# Patient Record
Sex: MALE | Race: WHITE | Employment: OTHER
[De-identification: names, ages, dates, MRNs, and addresses within clinical notes are randomized per-mention and may not be internally consistent; named-entity substitution may affect disease eponyms.]

---

## 2023-09-29 ENCOUNTER — HOSPITAL ENCOUNTER (OUTPATIENT)
Facility: HOSPITAL | Age: 68
Setting detail: RECURRING SERIES
End: 2023-09-29
Payer: MEDICARE

## 2023-09-29 PROCEDURE — 97110 THERAPEUTIC EXERCISES: CPT

## 2023-09-29 PROCEDURE — 97162 PT EVAL MOD COMPLEX 30 MIN: CPT

## 2023-09-29 NOTE — PROGRESS NOTES
PT DAILY TREATMENT NOTE/LUMBAR EVAL     Patient Name: Fatoumata Small    Date: 2023    : 1955  Insurance: Payor: MEDICARE / Plan: MEDICARE PART A AND B / Product Type: *No Product type* /      Patient  verified yes     Visit #   Current / Total 1 12   Time   In / Out 9:03 9:44   Pain   In / Out 1.5 1.5   Subjective Functional Status/Changes: See eval       Treatment Area: Other low back pain [M54.59]  SUBJECTIVE  Pain Level (0-10 scale): Current pain: 1.5, worst: 8/10,   []constant []intermittent []improving []worsening []no change since onset  Any medication changes, allergies to medications, adverse drug reactions, diagnosis change, or new procedure performed?: [x] No    [] Yes (see summary sheet for update)  Subjective functional status/changes:     Off and on for months pain in L buttocks that feels like a pardeep horse that is worse depending on if he exercised. Particularly painful after pickle ball thinking because of squatting. Worst pain at night and in the morning to the point where he doesn't want to get out of bed, but reports it gets better throughout day. Difficult with rotation to get in/out of car and crossing his legs while sitting down to put on shoes is painful. Steroids have helped, reports minimal pain and just minor pardeep horse sensation. Reports pain when his \"muscles get cold\" and hasn't been moving. Denies burning/tingling/numbness. Pain walking on the beach/lots of walking would feel in the morning. Stairs hurt initially, really hard first thing in the morning, hard to sit<>stand. Pain pivoting. Alleviating: steroids, moving around. No PT for hip pain in the past.   PLOF: Pickleball, walks 5 miles  Mechanism of Injury: Insidious onset. Current symptoms/Complaints: Pain that feels like a pardeep horse through posterior hip musculature.   Previous Treatment/Compliance: steroids  Diagnostic Tests: [] Lab work [] X-rays    [] CT [] MRI     []

## 2023-09-29 NOTE — THERAPY EVALUATION
[address] [facility]  [address] [place] [address], [address], [place], [address] [place] [address] Ph: [phone] Fx: [phone]  Plan of Care / Statement of Necessity for Physical Therapy Services     Patient Name: Carolyn Martínez : 1955   Medical   Diagnosis: Other low back pain [M54.59] Treatment Diagnosis:   M54.59  OTHER LOWER BACK PAIN    Onset Date: 2023     Referral Source: Olivia Linda MD Start of Care [facility]): 2023   Prior Hospitalization: See medical history Provider #: [id number]   Prior Level of Function: Pickleball, walks 5 miles   Comorbidities: OA, CAD, osteoporosis     Assessment / key information:  Carolyn Martínez is a 76 y.o. male with Dx: lumbar radiculitis vs R hip pain. Pt rates pain as 8/10 max, 0/10 at best, 1.5/10 today, located along posterior hip musculature with intermittent radiation into middle of posterior thigh, stating it feels \"like a charley horse\". Aggravating factors include playing pickle ball, walking on the beach or uneven surfaces, and reports pain is worst first thing in the morning. Alleviating factors include steroids and movement. Pt would benefit from skilled PT to address ROM, strength, and pain to improve ability to perform hobbies of pickleball and improve overall QOL. Pertinent exam findings include:      Posture: Increased lumbar lordosis, hinging ~L4 during extension      Active Movements: [] N/A   [] Too acute   [] Other:  ROM % AROM Comments:pain, area   Forward flexion 40-60 75 Reports pain in posterior hip but states minimal   Extension 20-30 75 Denies pain   SB right 20-30 75 Denies pain   SB left 20-30 75 Denies pain   Rotation right 5-10 75 Denies pain   Rotation left 5-10 75 Denies pain      ROM (Degrees) Left Right   Hip Flexion 126 125   Hip IR 30 30   Hip ER 40 35      Repeated Movements   Reports pain with forward flexion repeatedly, but reports it doesn't radiate down into the leg.  Denies improvement with

## 2023-10-03 ENCOUNTER — HOSPITAL ENCOUNTER (OUTPATIENT)
Facility: HOSPITAL | Age: 68
Setting detail: RECURRING SERIES
Discharge: HOME | End: 2023-10-06
Payer: MEDICARE

## 2023-10-03 PROCEDURE — 97530 THERAPEUTIC ACTIVITIES: CPT

## 2023-10-03 PROCEDURE — 97112 NEUROMUSCULAR REEDUCATION: CPT

## 2023-10-03 PROCEDURE — 97110 THERAPEUTIC EXERCISES: CPT

## 2023-10-03 NOTE — PROGRESS NOTES
PHYSICAL / OCCUPATIONAL THERAPY - DAILY TREATMENT NOTE (updated )    Patient Name: Alyssa Puente    Date: 10/3/2023    : 1955  Insurance: Payor: MEDICARE / Plan: MEDICARE PART A AND B / Product Type: *No Product type* /      Patient  verified Yes     Visit #   Current / Total 2 12   Time   In / Out 11:40 12:36   Pain   In / Out 3-4 0   Subjective Functional Status/Changes: Reports began having pain again because steroid pack was finished. TREATMENT AREA =  Other low back pain [M54.59]    OBJECTIVE    Ice (UNBILLED):  location/position: lumbar/R hip     Min Rationale   10 decrease inflammation and decrease pain to improve patient's ability to progress to PLOF and address remaining functional goals. Skin assessment post-treatment:   Intact     Therapeutic Procedures:    68726 Therapeutic Exercise (timed):  increase ROM, strength, coordination, balance, and proprioception to improve patient's ability to progress to PLOF and address remaining functional goals. Tx Min Billable or 1:1 Min   (if diff from Tx Min) Details:   16 8 See flow sheet as applicable     10057 Neuromuscular Re-Education (timed):  improve balance, coordination, kinesthetic sense, posture, core stability and proprioception to improve patient's ability to develop conscious control of individual muscles and awareness of position of extremities in order to progress to PLOF and address remaining functional goals. Tx Min Billable or 1:1 Min   (if diff from Tx Min) Details:   20 20 See flow sheet as applicable     13191 Therapeutic Activity (timed):  use of dynamic activities replicating functional movements to increase ROM, strength, coordination, balance, and proprioception in order to improve patient's ability to progress to PLOF and address remaining functional goals. Tx Min Billable or 1:1 Min   (if diff from Tx Min) Details:   10 10 See flow sheet as applicable and discussion of activities to do at home.  Discussion of

## 2023-10-06 ENCOUNTER — HOSPITAL ENCOUNTER (OUTPATIENT)
Facility: HOSPITAL | Age: 68
Setting detail: RECURRING SERIES
Discharge: HOME | End: 2023-10-09
Payer: MEDICARE

## 2023-10-06 PROCEDURE — 97530 THERAPEUTIC ACTIVITIES: CPT

## 2023-10-06 PROCEDURE — 97110 THERAPEUTIC EXERCISES: CPT

## 2023-10-06 PROCEDURE — 97112 NEUROMUSCULAR REEDUCATION: CPT

## 2023-10-06 NOTE — PROGRESS NOTES
PHYSICAL / OCCUPATIONAL THERAPY - DAILY TREATMENT NOTE (updated )    Patient Name: Gissell Carrasco    Date: 10/6/2023    : 1955  Insurance: Payor: MEDICARE / Plan: MEDICARE PART A AND B / Product Type: *No Product type* /      Patient  verified Yes     Visit #   Current / Total 3 12   Time   In / Out 10:17 11:17   Pain   In / Out 2 0   Subjective Functional Status/Changes: Reports did single leg leg press at the gym and that helped eliminate pain in hips after playing pickleball. TREATMENT AREA =  Other low back pain [M54.59]    OBJECTIVE    Ice (UNBILLED):  location/position: hip/lumbar     Min Rationale   10 decrease inflammation and decrease pain to improve patient's ability to progress to PLOF and address remaining functional goals. Skin assessment post-treatment:   Intact     Therapeutic Procedures:    30529 Therapeutic Exercise (timed):  increase ROM, strength, coordination, balance, and proprioception to improve patient's ability to progress to PLOF and address remaining functional goals. Tx Min Billable or 1:1 Min   (if diff from Tx Min) Details:   15 10 See flow sheet as applicable     29701 Neuromuscular Re-Education (timed):  improve balance, coordination, kinesthetic sense, posture, core stability and proprioception to improve patient's ability to develop conscious control of individual muscles and awareness of position of extremities in order to progress to PLOF and address remaining functional goals. Tx Min Billable or 1:1 Min   (if diff from Tx Min) Details:   20  See flow sheet as applicable     15429 Therapeutic Activity (timed):  use of dynamic activities replicating functional movements to increase ROM, strength, coordination, balance, and proprioception in order to improve patient's ability to progress to PLOF and address remaining functional goals.    Tx Min Billable or 1:1 Min   (if diff from Tx Min) Details:   15  See flow sheet as applicable       50 39 [facility] Totals

## 2023-10-10 ENCOUNTER — HOSPITAL ENCOUNTER (OUTPATIENT)
Facility: HOSPITAL | Age: 68
Setting detail: RECURRING SERIES
Discharge: HOME | End: 2023-10-13
Payer: MEDICARE

## 2023-10-10 PROCEDURE — 97110 THERAPEUTIC EXERCISES: CPT

## 2023-10-10 PROCEDURE — 97530 THERAPEUTIC ACTIVITIES: CPT

## 2023-10-10 PROCEDURE — 97112 NEUROMUSCULAR REEDUCATION: CPT

## 2023-10-10 NOTE — PROGRESS NOTES
PHYSICAL / OCCUPATIONAL THERAPY - DAILY TREATMENT NOTE (updated )    Patient Name: Kendell Chavez    Date: 10/10/2023    : 1955  Insurance: Payor: MEDICARE / Plan: MEDICARE PART A AND B / Product Type: *No Product type* /      Patient  verified Yes     Visit #   Current / Total 4 12   Time   In / Out 1140 1233   Pain   In / Out 1 0   Subjective Functional Status/Changes: Pt states he has pain in his back when putting his shoes on     TREATMENT AREA =  Other low back pain [M54.59]    OBJECTIVE    Modalities Rationale:     decrease inflammation and decrease pain to improve patient's ability to progress to PLOF and address remaining functional goals. min [] Estim Unattended, type/location:                                      []  w/ice    []  w/heat    min [] Estim Attended, type/location:                                     []  w/US     []  w/ice    []  w/heat    []  TENS insruct      min []  Mechanical Traction: type/lbs                   []  pro   []  sup   []  int   []  cont    []  before manual    []  after manual    min []  Ultrasound, settings/location:     10 min  unbill [x]  Ice     []  Heat    location/position: L/S, right hip/reclined    min []  Paraffin,  details:     min []  Vasopneumatic Device, press/temp:     min []  Natalie Leahy / Lacey Seth: If using vaso (only need to measure limb vaso being performed on)      pre-treatment girth :       post-treatment girth :       measured at (landmark location) :      min []  Other:    Skin assessment post-treatment:   Intact      Therapeutic Procedures: Tx Min Billable or 1:1 Min (if diff from Tx Min) Procedure, Rationale, Specifics   22  54960 Therapeutic Exercise (timed):  increase ROM, strength, coordination, balance, and proprioception to improve patient's ability to progress to PLOF and address remaining functional goals.  (see flow sheet as applicable)     Details if applicable:       8  77854 Therapeutic Activity (timed):  use of dynamic

## 2023-10-13 ENCOUNTER — HOSPITAL ENCOUNTER (OUTPATIENT)
Facility: HOSPITAL | Age: 68
Setting detail: RECURRING SERIES
Discharge: HOME | End: 2023-10-16
Payer: MEDICARE

## 2023-10-13 PROCEDURE — 97110 THERAPEUTIC EXERCISES: CPT

## 2023-10-13 PROCEDURE — 97112 NEUROMUSCULAR REEDUCATION: CPT

## 2023-10-13 PROCEDURE — 97530 THERAPEUTIC ACTIVITIES: CPT

## 2023-10-13 NOTE — PROGRESS NOTES
improvement in self-reported function. Pt will be independent with final HEP to promote self-management of condition.   Pt will demonstrate at least 4+/5 hip strength to improve ability to perform pickleball     Next PN/ RC due 10/29/2023     PLAN  - Continue Plan of Care  - Upgrade activities as tolerated    Eleni Lester, PT    10/13/2023    11:48 AM    Future Appointments   Date Time Provider [address]   10/17/2023 11:40 AM Carito Villela [facility]   10/20/2023 11:40 AM Eleni Lester, PT [facility]   10/24/2023 11:40 AM Eleni Lester, PT [facility]   10/27/2023 11:40 AM Eleni Lester, PT [facility]   10/31/2023 11:40 AM Eleni Lester, PT [facility]   11/3/2023 11:40 AM Eleni Lester, PT [facility]   11[facility]7/2023 11:40 AM [facility]   11/10/2023 11:40 AM Eleni Lester, PT [facility]

## 2023-10-17 ENCOUNTER — HOSPITAL ENCOUNTER (OUTPATIENT)
Facility: HOSPITAL | Age: 68
Setting detail: RECURRING SERIES
Discharge: HOME | End: 2023-10-20
Payer: MEDICARE

## 2023-10-17 PROCEDURE — 97530 THERAPEUTIC ACTIVITIES: CPT

## 2023-10-17 PROCEDURE — 97110 THERAPEUTIC EXERCISES: CPT

## 2023-10-17 PROCEDURE — 97112 NEUROMUSCULAR REEDUCATION: CPT

## 2023-10-17 NOTE — PROGRESS NOTES
PHYSICAL / OCCUPATIONAL THERAPY - DAILY TREATMENT NOTE (updated )    Patient Name: Alexandra Gayle    Date: 10/17/2023    : 1955  Insurance: Payor: MEDICARE / Plan: MEDICARE PART A AND B / Product Type: *No Product type* /      Patient  verified Yes     Visit #   Current / Total 6 12   Time   In / Out 1140 1235   Pain   In / Out 0/10 0/10   Subjective Functional Status/Changes: Pt reports soreness in his lower back and right hip today. States he played pickleball yesterday and started having soreness. TREATMENT AREA =  Other low back pain [M54.59]    OBJECTIVE    Ice (UNBILLED):  location/position: sitting, lumbar/R hip        Min Rationale   10 decrease inflammation and decrease pain to improve patient's ability to progress to PLOF and address remaining functional goals. Skin assessment post-treatment:   Intact     Therapeutic Procedures:    21535 Therapeutic Exercise (timed):  increase ROM, strength, coordination, balance, and proprioception to improve patient's ability to progress to PLOF and address remaining functional goals. Tx Min Billable or 1:1 Min   (if diff from Tx Min) Details:   22  See flow sheet as applicable     03766 Neuromuscular Re-Education (timed):  improve balance, coordination, kinesthetic sense, posture, core stability and proprioception to improve patient's ability to develop conscious control of individual muscles and awareness of position of extremities in order to progress to PLOF and address remaining functional goals. Tx Min Billable or 1:1 Min   (if diff from Tx Min) Details:   13  See flow sheet as applicable     39392 Therapeutic Activity (timed):  use of dynamic activities replicating functional movements to increase ROM, strength, coordination, balance, and proprioception in order to improve patient's ability to progress to PLOF and address remaining functional goals.    Tx Min Billable or 1:1 Min   (if diff from Tx Min) Details:   10  See flow sheet as

## 2023-10-20 ENCOUNTER — HOSPITAL ENCOUNTER (OUTPATIENT)
Facility: HOSPITAL | Age: 68
Setting detail: RECURRING SERIES
Discharge: HOME | End: 2023-10-23
Payer: MEDICARE

## 2023-10-20 PROCEDURE — 97112 NEUROMUSCULAR REEDUCATION: CPT

## 2023-10-20 PROCEDURE — 97110 THERAPEUTIC EXERCISES: CPT

## 2023-10-20 PROCEDURE — 97530 THERAPEUTIC ACTIVITIES: CPT

## 2023-10-20 NOTE — PROGRESS NOTES
PHYSICAL / OCCUPATIONAL THERAPY - DAILY TREATMENT NOTE (updated )    Patient Name: Maria Esther Coles    Date: 10/20/2023    : 1955  Insurance: Payor: MEDICARE / Plan: MEDICARE PART A AND B / Product Type: *No Product type* /      Patient  verified Yes     Visit #   Current / Total 7 12   Time   In / Out 11:43 12:34   Pain   In / Out 0 0   Subjective Functional Status/Changes: Hasn't had pain over the last few days. No pain this morning. No pain with pickleball. TREATMENT AREA =  Other low back pain [M54.59]    OBJECTIVE    Ice (UNBILLED):  location/position: low back/posterior hip     Min Rationale   10 decrease inflammation and decrease pain to improve patient's ability to progress to PLOF and address remaining functional goals. Skin assessment post-treatment:   Intact     Therapeutic Procedures:    68622 Therapeutic Exercise (timed):  increase ROM, strength, coordination, balance, and proprioception to improve patient's ability to progress to PLOF and address remaining functional goals. Tx Min Billable or 1:1 Min   (if diff from Tx Min) Details:   10  See flow sheet as applicable     45031 Neuromuscular Re-Education (timed):  improve balance, coordination, kinesthetic sense, posture, core stability and proprioception to improve patient's ability to develop conscious control of individual muscles and awareness of position of extremities in order to progress to PLOF and address remaining functional goals. Tx Min Billable or 1:1 Min   (if diff from Tx Min) Details:   15  See flow sheet as applicable     84825 Therapeutic Activity (timed):  use of dynamic activities replicating functional movements to increase ROM, strength, coordination, balance, and proprioception in order to improve patient's ability to progress to PLOF and address remaining functional goals.    Tx Min Billable or 1:1 Min   (if diff from Tx Min) Details:   16  See flow sheet as applicable       41  [facility] Totals Reminder: Aditi Oro

## 2023-10-24 ENCOUNTER — HOSPITAL ENCOUNTER (OUTPATIENT)
Facility: HOSPITAL | Age: 68
Setting detail: RECURRING SERIES
Discharge: HOME | End: 2023-10-27
Payer: MEDICARE

## 2023-10-24 PROCEDURE — 97110 THERAPEUTIC EXERCISES: CPT

## 2023-10-24 PROCEDURE — 97112 NEUROMUSCULAR REEDUCATION: CPT

## 2023-10-24 PROCEDURE — 97530 THERAPEUTIC ACTIVITIES: CPT

## 2023-10-24 NOTE — PROGRESS NOTES
self-reported function. Pt will be independent with final HEP to promote self-management of condition.   Pt will demonstrate at least 4+/5 hip strength to improve ability to perform pickleball     Next PN/ RC due 10/29/2023      PLAN  - Continue Plan of Care  - Upgrade activities as tolerated    Sole Kumari, PTA    10/24/2023    11:25 AM    Future Appointments   Date Time Provider [address]   10/24/2023 11:40 AM Kelsi Rochester [facility]   10/27/2023 11:40 AM Timur Gaytan, PT [facility]   10/31/2023 11:40 AM Timur Gaytan, PT [facility]   11/3/2023 11:40 AM Timur Gaytan, PT [facility]   11/7/2023 11:40 AM Timur Gaytan, PT [facility]   11/10/2023 11:40 AM Timur Gaytan, PT [facility]

## 2023-10-27 ENCOUNTER — HOSPITAL ENCOUNTER (OUTPATIENT)
Facility: HOSPITAL | Age: 68
Setting detail: RECURRING SERIES
Discharge: HOME | End: 2023-10-30
Payer: MEDICARE

## 2023-10-27 PROCEDURE — 97112 NEUROMUSCULAR REEDUCATION: CPT

## 2023-10-27 PROCEDURE — 97530 THERAPEUTIC ACTIVITIES: CPT

## 2023-10-27 PROCEDURE — 97110 THERAPEUTIC EXERCISES: CPT

## 2023-10-27 NOTE — PROGRESS NOTES
PHYSICAL / OCCUPATIONAL THERAPY - DAILY TREATMENT NOTE (updated )    Patient Name: Krystal Carter    Date: 10/27/2023    : 1955  Insurance: Payor: MEDICARE / Plan: MEDICARE PART A AND B / Product Type: *No Product type* /      Patient  verified Yes     Visit #   Current / Total 9 12   Time   In / Out 11:40 12:22   Pain   In / Out 0 0   Subjective Functional Status/Changes: Pt reports 90% improvement. \"Everything's gotten better. \" Pain has gone down 90%. Less pain playing pickleball. Slow first thing in the morning, but once up and moving okay. Pain with walking on beach in the sand, if very active throughout the day, reaching ~5-6/10. Reports compliance with HEP. TREATMENT AREA =  Other low back pain [M54.59]    OBJECTIVE    Therapeutic Procedures:    51620 Therapeutic Exercise (timed):  increase ROM, strength, coordination, balance, and proprioception to improve patient's ability to progress to PLOF and address remaining functional goals. Tx Min Billable or 1:1 Min   (if diff from Tx Min) Details:   10  See flow sheet as applicable     17951 Neuromuscular Re-Education (timed):  improve balance, coordination, kinesthetic sense, posture, core stability and proprioception to improve patient's ability to develop conscious control of individual muscles and awareness of position of extremities in order to progress to PLOF and address remaining functional goals. Tx Min Billable or 1:1 Min   (if diff from Tx Min) Details:   15  See flow sheet as applicable     47158 Therapeutic Activity (timed):  use of dynamic activities replicating functional movements to increase ROM, strength, coordination, balance, and proprioception in order to improve patient's ability to progress to PLOF and address remaining functional goals.    Tx Min Billable or 1:1 Min   (if diff from Tx Min) Details:   17  See flow sheet as applicable       43  [facility] Totals Reminder: bill using total billable min of TIMED therapeutic

## 2023-10-31 ENCOUNTER — HOSPITAL ENCOUNTER (OUTPATIENT)
Facility: HOSPITAL | Age: 68
Setting detail: RECURRING SERIES
Discharge: HOME | End: 2023-11-03
Payer: MEDICARE

## 2023-10-31 PROCEDURE — 97110 THERAPEUTIC EXERCISES: CPT

## 2023-10-31 PROCEDURE — 97530 THERAPEUTIC ACTIVITIES: CPT

## 2023-10-31 PROCEDURE — 97112 NEUROMUSCULAR REEDUCATION: CPT

## 2023-10-31 NOTE — PROGRESS NOTES
PHYSICAL / OCCUPATIONAL THERAPY - DAILY TREATMENT NOTE (updated )    Patient Name: Bhumika Allen    Date: 10/31/2023    : 1955  Insurance: Payor: MEDICARE / Plan: MEDICARE PART A AND B / Product Type: *No Product type* /      Patient  verified Yes     Visit #   Current / Total 10 14   Time   In / Out 11:42 12:23   Pain   In / Out 2 0   Subjective Functional Status/Changes: Reports a pain that is lingering. TREATMENT AREA =  Other low back pain [M54.59]    OBJECTIVE    Therapeutic Procedures:    10520 Therapeutic Exercise (timed):  increase ROM, strength, coordination, balance, and proprioception to improve patient's ability to progress to PLOF and address remaining functional goals. Tx Min Billable or 1:1 Min   (if diff from Tx Min) Details:   8  See flow sheet as applicable     97432 Neuromuscular Re-Education (timed):  improve balance, coordination, kinesthetic sense, posture, core stability and proprioception to improve patient's ability to develop conscious control of individual muscles and awareness of position of extremities in order to progress to PLOF and address remaining functional goals. Tx Min Billable or 1:1 Min   (if diff from Tx Min) Details:   18  See flow sheet as applicable     13023 Therapeutic Activity (timed):  use of dynamic activities replicating functional movements to increase ROM, strength, coordination, balance, and proprioception in order to improve patient's ability to progress to PLOF and address remaining functional goals.    Tx Min Billable or 1:1 Min   (if diff from Tx Min) Details:   15  See flow sheet as applicable       41  [facility] Totals Reminder: bill using total billable min of TIMED therapeutic procedures (example: do not include dry needle or estim unattended, both untimed codes, in totals to left)  8-22 min = 1 unit; 23-37 min = 2 units; 38-52 min = 3 units; 53-67 min = 4 units; 68-82 min = 5 units   Total Total     [x]  Patient Education billed

## 2023-11-03 ENCOUNTER — HOSPITAL ENCOUNTER (OUTPATIENT)
Facility: HOSPITAL | Age: 68
Setting detail: RECURRING SERIES
Discharge: HOME | End: 2023-11-06
Payer: MEDICARE

## 2023-11-03 PROCEDURE — 97530 THERAPEUTIC ACTIVITIES: CPT

## 2023-11-03 PROCEDURE — 97112 NEUROMUSCULAR REEDUCATION: CPT

## 2023-11-03 PROCEDURE — 97110 THERAPEUTIC EXERCISES: CPT

## 2023-11-03 NOTE — PROGRESS NOTES
PHYSICAL / OCCUPATIONAL THERAPY - DAILY TREATMENT NOTE (updated )    Patient Name: Elizabeth Bills    Date: 11/3/2023    : 1955  Insurance: Payor: MEDICARE / Plan: MEDICARE PART A AND B / Product Type: *No Product type* /      Patient  verified Yes     Visit #   Current / Total 11 12   Time   In / Out 11:46 12:38   Pain   In / Out 1.5 0   Subjective Functional Status/Changes: Reports pain that is still there, states that doesn't get      TREATMENT AREA =  Other low back pain [M54.59]    OBJECTIVE    Ice (UNBILLED):  location/position: sitting, lumbar/R hip/R knee     Min Rationale   10 decrease inflammation and decrease pain to improve patient's ability to progress to PLOF and address remaining functional goals. Skin assessment post-treatment:   Intact     Therapeutic Procedures:    43458 Therapeutic Exercise (timed):  increase ROM, strength, coordination, balance, and proprioception to improve patient's ability to progress to PLOF and address remaining functional goals. Tx Min Billable or 1:1 Min   (if diff from Tx Min) Details:   12  See flow sheet as applicable     85072 Neuromuscular Re-Education (timed):  improve balance, coordination, kinesthetic sense, posture, core stability and proprioception to improve patient's ability to develop conscious control of individual muscles and awareness of position of extremities in order to progress to PLOF and address remaining functional goals. Tx Min Billable or 1:1 Min   (if diff from Tx Min) Details:   15  See flow sheet as applicable     47356 Therapeutic Activity (timed):  use of dynamic activities replicating functional movements to increase ROM, strength, coordination, balance, and proprioception in order to improve patient's ability to progress to PLOF and address remaining functional goals.    Tx Min Billable or 1:1 Min   (if diff from Tx Min) Details:   15  See flow sheet as applicable       42  [facility] Totals Reminder: bill using total

## 2023-11-07 ENCOUNTER — APPOINTMENT (OUTPATIENT)
Facility: HOSPITAL | Age: 68
End: 2023-11-07
Payer: MEDICARE

## 2023-11-10 ENCOUNTER — APPOINTMENT (OUTPATIENT)
Facility: HOSPITAL | Age: 68
End: 2023-11-10
Payer: MEDICARE

## 2023-11-17 ENCOUNTER — HOSPITAL ENCOUNTER (OUTPATIENT)
Facility: HOSPITAL | Age: 68
Setting detail: RECURRING SERIES
Discharge: HOME | End: 2023-11-20
Payer: MEDICARE

## 2023-11-17 PROCEDURE — 97530 THERAPEUTIC ACTIVITIES: CPT

## 2023-11-17 PROCEDURE — 97112 NEUROMUSCULAR REEDUCATION: CPT

## 2023-11-17 PROCEDURE — 97110 THERAPEUTIC EXERCISES: CPT

## 2023-11-17 NOTE — PROGRESS NOTES
PHYSICAL / OCCUPATIONAL THERAPY - DAILY TREATMENT NOTE (updated )    Patient Name: Bhumika Allen    Date: 2023    : 1955  Insurance: Payor: MEDICARE / Plan: MEDICARE PART A AND B / Product Type: *No Product type* /      Patient  verified Yes     Visit #   Current / Total 12 16   Time   In / Out 11:39 12:21   Pain   In / Out 0 0   Subjective Functional Status/Changes: Increased pain over the last few weeks. Given meloxicam which is helping. Exercises helped. Has some pain into calf. TREATMENT AREA =  Other low back pain [M54.59]    OBJECTIVE    Therapeutic Procedures:    38700 Therapeutic Exercise (timed):  increase ROM, strength, coordination, balance, and proprioception to improve patient's ability to progress to PLOF and address remaining functional goals. Tx Min Billable or 1:1 Min   (if diff from Tx Min) Details:   12  See flow sheet as applicable     75814 Neuromuscular Re-Education (timed):  improve balance, coordination, kinesthetic sense, posture, core stability and proprioception to improve patient's ability to develop conscious control of individual muscles and awareness of position of extremities in order to progress to PLOF and address remaining functional goals. Tx Min Billable or 1:1 Min   (if diff from Tx Min) Details:   15  See flow sheet as applicable     86384 Therapeutic Activity (timed):  use of dynamic activities replicating functional movements to increase ROM, strength, coordination, balance, and proprioception in order to improve patient's ability to progress to PLOF and address remaining functional goals.    Tx Min Billable or 1:1 Min   (if diff from Tx Min) Details:   15  See flow sheet as applicable and Discussion of pt functional status       42  [facility] Totals Reminder: bill using total billable min of TIMED therapeutic procedures (example: do not include dry needle or estim unattended, both untimed codes, in totals to left)  8-22 min = 1 unit; 23-37 min = 2

## 2023-11-21 ENCOUNTER — HOSPITAL ENCOUNTER (OUTPATIENT)
Facility: HOSPITAL | Age: 68
Setting detail: RECURRING SERIES
Discharge: HOME | End: 2023-11-24
Payer: MEDICARE

## 2023-11-21 PROCEDURE — 97110 THERAPEUTIC EXERCISES: CPT

## 2023-11-21 PROCEDURE — 97112 NEUROMUSCULAR REEDUCATION: CPT

## 2023-11-21 PROCEDURE — 97535 SELF CARE MNGMENT TRAINING: CPT

## 2023-11-21 NOTE — PROGRESS NOTES
PHYSICAL / OCCUPATIONAL THERAPY - DAILY TREATMENT NOTE (updated )    Patient Name: Héctor Sandoval    Date: 2023    : 1955  Insurance: Payor: MEDICARE / Plan: MEDICARE PART A AND B / Product Type: *No Product type* /      Patient  verified Yes     Visit #   Current / Total 13 16   Time   In / Out 12:28 1:16   Pain   In / Out 6 <6   Subjective Functional Status/Changes: Significant increase in pain for the last week. Increased pain with prolonged sitting in the car. Increased pain into the calf. TREATMENT AREA =  Other low back pain [M54.59]    OBJECTIVE    Ice (UNBILLED):  location/position: prone, lumbar/hip     Min Rationale   10 decrease inflammation and decrease pain to improve patient's ability to progress to PLOF and address remaining functional goals. Skin assessment post-treatment:   Intact     Therapeutic Procedures:    02565 Therapeutic Exercise (timed):  increase ROM, strength, coordination, balance, and proprioception to improve patient's ability to progress to PLOF and address remaining functional goals. Tx Min Billable or 1:1 Min   (if diff from Tx Min) Details:   10  See flow sheet as applicable     40029 Neuromuscular Re-Education (timed):  improve balance, coordination, kinesthetic sense, posture, core stability and proprioception to improve patient's ability to develop conscious control of individual muscles and awareness of position of extremities in order to progress to PLOF and address remaining functional goals. Tx Min Billable or 1:1 Min   (if diff from Tx Min) Details:   15  See flow sheet as applicable     13125 Self Care/Home Management (timed):  improve patient knowledge and understanding of pain reducing techniques, positioning, posture/ergonomics, and activity modification  to improve patient's ability to progress to PLOF and address remaining functional goals.     Tx Min Billable or 1:1 Min   (if diff from Tx Min) Details:   13  Discussed introduction of

## 2023-11-28 ENCOUNTER — HOSPITAL ENCOUNTER (OUTPATIENT)
Facility: HOSPITAL | Age: 68
Setting detail: RECURRING SERIES
Discharge: HOME | End: 2023-12-01
Payer: MEDICARE

## 2023-11-28 PROCEDURE — 97112 NEUROMUSCULAR REEDUCATION: CPT

## 2023-11-28 PROCEDURE — 97530 THERAPEUTIC ACTIVITIES: CPT

## 2023-11-28 PROCEDURE — 97110 THERAPEUTIC EXERCISES: CPT

## 2023-11-28 NOTE — PROGRESS NOTES
PHYSICAL / OCCUPATIONAL THERAPY - DAILY TREATMENT NOTE (updated )    Patient Name: Elizabeth Bills    Date: 2023    : 1955  Insurance: Payor: MEDICARE / Plan: MEDICARE PART A AND B / Product Type: *No Product type* /      Patient  verified Yes     Visit #   Current / Total 14 14   Time   In / Out 11:40 12:23   Pain   In / Out 3 0   Subjective Functional Status/Changes: Pain has gotten significantly worse. Reports it's been okay the last couple days, but doesn't think PT is helping as much as it once was. Reports increased pain. Difficult tying shoes/putting on pants. Awaiting TOSIN in January     TREATMENT AREA =  Other low back pain [M54.59]    OBJECTIVE    Therapeutic Procedures:    41255 Therapeutic Exercise (timed):  increase ROM, strength, coordination, balance, and proprioception to improve patient's ability to progress to PLOF and address remaining functional goals. Tx Min Billable or 1:1 Min   (if diff from Tx Min) Details:   15  See flow sheet as applicable     09015 Neuromuscular Re-Education (timed):  improve balance, coordination, kinesthetic sense, posture, core stability and proprioception to improve patient's ability to develop conscious control of individual muscles and awareness of position of extremities in order to progress to PLOF and address remaining functional goals. Tx Min Billable or 1:1 Min   (if diff from Tx Min) Details:   15  See flow sheet as applicable     78012 Therapeutic Activity (timed):  use of dynamic activities replicating functional movements to increase ROM, strength, coordination, balance, and proprioception in order to improve patient's ability to progress to PLOF and address remaining functional goals.    Tx Min Billable or 1:1 Min   (if diff from Tx Min) Details:   13  See flow sheet as applicable and discussion of pt functional status       43  [facility] Totals Reminder: bill using total billable min of TIMED therapeutic procedures (example: do not